# Patient Record
Sex: FEMALE | Race: WHITE
[De-identification: names, ages, dates, MRNs, and addresses within clinical notes are randomized per-mention and may not be internally consistent; named-entity substitution may affect disease eponyms.]

---

## 2019-12-10 ENCOUNTER — APPOINTMENT (OUTPATIENT)
Dept: OBGYN | Facility: CLINIC | Age: 49
End: 2019-12-10

## 2019-12-10 PROBLEM — Z00.00 ENCOUNTER FOR PREVENTIVE HEALTH EXAMINATION: Status: ACTIVE | Noted: 2019-12-10

## 2020-01-14 ENCOUNTER — APPOINTMENT (OUTPATIENT)
Dept: OBGYN | Facility: CLINIC | Age: 50
End: 2020-01-14

## 2020-02-13 ENCOUNTER — APPOINTMENT (OUTPATIENT)
Dept: OBGYN | Facility: CLINIC | Age: 50
End: 2020-02-13

## 2023-09-11 ENCOUNTER — EMERGENCY (EMERGENCY)
Facility: HOSPITAL | Age: 53
LOS: 0 days | Discharge: HOME | End: 2023-09-11
Attending: EMERGENCY MEDICINE
Payer: MEDICAID

## 2023-09-11 VITALS — HEIGHT: 64 IN | WEIGHT: 149.91 LBS

## 2023-09-11 VITALS
SYSTOLIC BLOOD PRESSURE: 177 MMHG | RESPIRATION RATE: 19 BRPM | DIASTOLIC BLOOD PRESSURE: 99 MMHG | TEMPERATURE: 98 F | HEART RATE: 112 BPM | OXYGEN SATURATION: 98 %

## 2023-09-11 DIAGNOSIS — B37.31 ACUTE CANDIDIASIS OF VULVA AND VAGINA: ICD-10-CM

## 2023-09-11 DIAGNOSIS — L29.2 PRURITUS VULVAE: ICD-10-CM

## 2023-09-11 PROCEDURE — 99283 EMERGENCY DEPT VISIT LOW MDM: CPT

## 2023-09-11 PROCEDURE — 99284 EMERGENCY DEPT VISIT MOD MDM: CPT

## 2023-09-11 RX ORDER — FLUCONAZOLE 150 MG/1
150 TABLET ORAL ONCE
Refills: 0 | Status: COMPLETED | OUTPATIENT
Start: 2023-09-11 | End: 2023-09-11

## 2023-09-11 RX ADMIN — FLUCONAZOLE 150 MILLIGRAM(S): 150 TABLET ORAL at 19:39

## 2023-09-11 NOTE — ED STATDOCS - PATIENT PORTAL LINK FT
You can access the FollowMyHealth Patient Portal offered by [facility] by registering at the following website: [email or web address]. By joining [facility]’s FollowMyHealth portal, you will also be able to view your health information using other applications (apps) compatible with our system.

## 2023-09-11 NOTE — ED ADULT TRIAGE NOTE - CHIEF COMPLAINT QUOTE
Female
Pt presents to the ED c/o vaginal itching x3 days. Denies rash, vaginal discharge or fevers. Symptoms began after taking amoxicillin for a dental infection.
Female

## 2023-09-11 NOTE — ED STATDOCS - PROGRESS NOTE DETAILS
patient with vaginal yeast infection on exam performed by me chaperoned by Dr. Jalloh.  Will treat with 1 time dose of diflucan, advised gyn f/u -Ivy Sheikh PA-C

## 2023-09-11 NOTE — ED STATDOCS - OBJECTIVE STATEMENT
51 y/o female with no pertinent PMHx presents to the ED c/o vaginal itching for 3 days. States the symptoms began after taking amoxicillin for dental infection. Denies rash, discharge, fever.

## 2023-09-11 NOTE — ED STATDOCS - CARE PROVIDER_API CALL
Allie Enriquez  Obstetrics and Gynecology  [address], [address]  [place] [address]  Phone: [phone]  Fax: [phone]  Follow Up Time:

## 2023-09-11 NOTE — ED STATDOCS - NSFOLLOWUPINSTRUCTIONS_ED_ALL_ED_FT
Vaginal Yeast Infection, Adult  Female body with a close-up showing the vagina with a yeast infection.  Vaginal yeast infection is a condition that causes vaginal discharge as well as soreness, swelling, and redness (inflammation) of the vagina. This is a common condition. Some women get this infection frequently.    What are the causes?  This condition is caused by a change in the normal balance of the yeast (Candida) and normal bacteria that live in the vagina. This change causes an overgrowth of yeast, which causes the inflammation.    What increases the risk?  The condition is more likely to develop in women who:  Take antibiotic medicines.  Have diabetes.  Take birth control pills.  Are pregnant.  Douche often.  Have a weak body defense system (immune system).  Have been taking steroid medicines for a long time.  Frequently wear tight clothing.  What are the signs or symptoms?  Symptoms of this condition include:  White, thick, creamy vaginal discharge.  Swelling, itching, redness, and irritation of the vagina. The lips of the vagina (labia) may be affected as well.  Pain or a burning feeling while urinating.  Pain during sex.  How is this diagnosed?  This condition is diagnosed based on:  Your medical history.  A physical exam.  A pelvic exam. Your health care provider will examine a sample of your vaginal discharge under a microscope. Your health care provider may send this sample for testing to confirm the diagnosis.  How is this treated?  This condition is treated with medicine. Medicines may be over-the-counter or prescription. You may be told to use one or more of the following:  Medicine that is taken by mouth (orally).  Medicine that is applied as a cream (topically).  Medicine that is inserted directly into the vagina (suppository).  Follow these instructions at home:  Take or apply over-the-counter and prescription medicines only as told by your health care provider.  Do not use tampons until your health care provider approves.  Do not have sex until your infection has cleared. Sex can prolong or worsen your symptoms of infection. Ask your health care provider when it is safe to resume sexual activity.  Keep all follow-up visits. This is important.  How is this prevented?  A sign showing that a person should not douche.  Do not wear tight clothes, such as pantyhose or tight pants.  Wear breathable cotton underwear.  Do not use douches, perfumed soap, creams, or powders.  Wipe from front to back after using the toilet.  If you have diabetes, keep your blood sugar levels under control.  Ask your health care provider for other ways to prevent yeast infections.  Contact a health care provider if:  You have a fever.  Your symptoms go away and then return.  Your symptoms do not get better with treatment.  Your symptoms get worse.  You have new symptoms.  You develop blisters in or around your vagina.  You have blood coming from your vagina and it is not your menstrual period.  You develop pain in your abdomen.  Summary  Vaginal yeast infection is a condition that causes discharge as well as soreness, swelling, and redness (inflammation) of the vagina.  This condition is treated with medicine. Medicines may be over-the-counter or prescription.  Take or apply over-the-counter and prescription medicines only as told by your health care provider.  Do not douche. Resume sexual activity or use of tampons as instructed by your health care provider.  Contact a health care provider if your symptoms do not get better with treatment or your symptoms go away and then return.  This information is not intended to replace advice given to you by your health care provider. Make sure you discuss any questions you have with your health care provider.

## 2023-12-18 NOTE — ED ADULT NURSE NOTE - CHIEF COMPLAINT QUOTE
Problem: Safety - Adult  Goal: Free from fall injury  Outcome: Completed
Pt presents to the ED c/o vaginal itching x3 days. Denies rash, vaginal discharge or fevers. Symptoms began after taking amoxicillin for a dental infection.

## 2024-03-14 NOTE — ED ADULT TRIAGE NOTE - INTERNATIONAL TRAVEL
Attempted to call pt in regards requesting lab work no answer LVM     ----- Message from Julie R. Jeansonne, MD sent at 3/14/2024  1:17 PM CDT -----  Regarding: FW: Lab work  She needs to see PCP for exam and these things she is requesting.   ----- Message -----  From: Bernadette Moore  Sent: 3/14/2024   1:06 PM CDT  To: Jeansonne Julie Staff  Subject: Lab work                                         Type: Patient Call Back    Who called: p/t     What is the request in detail: p/t is calling b/c she states she needs labs done urgently for a surgery she is having. She states she is over the deadline, She is requesting full panel of blood work, EKG, and medical clearance with physical exam and clearance notes. She also wants to know can she get tested for nicotine. Please call to assist.     Can the clinic reply by [facility]?     Would the patient rather a call back or a response via [facility]?     Best call back number:  [phone]    Additional Information:      
No

## 2025-02-13 ENCOUNTER — OFFICE (OUTPATIENT)
Dept: URBAN - METROPOLITAN AREA CLINIC 63 | Facility: CLINIC | Age: 55
Setting detail: OPHTHALMOLOGY
End: 2025-02-13
Payer: COMMERCIAL

## 2025-02-13 DIAGNOSIS — H25.813: ICD-10-CM

## 2025-02-13 PROBLEM — H52.4 PRESBYOPIA: Status: ACTIVE | Noted: 2025-02-13

## 2025-02-13 PROCEDURE — 92002 INTRM OPH EXAM NEW PATIENT: CPT | Performed by: INTERNAL MEDICINE

## 2025-02-13 ASSESSMENT — CONFRONTATIONAL VISUAL FIELD TEST (CVF)
OD_FINDINGS: FULL
OS_FINDINGS: FULL

## 2025-02-13 ASSESSMENT — TONOMETRY
OD_IOP_MMHG: 15
OS_IOP_MMHG: 15

## 2025-02-13 ASSESSMENT — REFRACTION_AUTOREFRACTION
OD_AXIS: 069
OD_CYLINDER: -0.50
OD_SPHERE: +1.00
OS_AXIS: 123
OS_CYLINDER: -0.75
OS_SPHERE: +1.00

## 2025-02-13 ASSESSMENT — REFRACTION_MANIFEST
OD_SPHERE: +2.75
OD_AXIS: 070
OD_VA1: 20/20
OS_SPHERE: +2.75
OS_VA1: 20/20
OS_AXIS: 120
OS_CYLINDER: -0.75
OD_CYLINDER: -0.50
OU_VA: 20/20

## 2025-02-13 ASSESSMENT — KERATOMETRY
OS_AXISANGLE_DEGREES: 068
OS_K2POWER_DIOPTERS: 43.25
OD_K1POWER_DIOPTERS: 42.50
OD_K2POWER_DIOPTERS: 42.75
OS_K1POWER_DIOPTERS: 42.50
OD_AXISANGLE_DEGREES: 081

## 2025-02-13 ASSESSMENT — VISUAL ACUITY
OS_BCVA: 20/25-1
OD_BCVA: 20/25